# Patient Record
Sex: MALE | Race: ASIAN | NOT HISPANIC OR LATINO
[De-identification: names, ages, dates, MRNs, and addresses within clinical notes are randomized per-mention and may not be internally consistent; named-entity substitution may affect disease eponyms.]

---

## 2017-11-21 ENCOUNTER — TRANSCRIPTION ENCOUNTER (OUTPATIENT)
Age: 37
End: 2017-11-21

## 2019-08-24 ENCOUNTER — TRANSCRIPTION ENCOUNTER (OUTPATIENT)
Age: 39
End: 2019-08-24

## 2020-03-09 ENCOUNTER — TRANSCRIPTION ENCOUNTER (OUTPATIENT)
Age: 40
End: 2020-03-09

## 2020-10-16 ENCOUNTER — APPOINTMENT (OUTPATIENT)
Dept: RADIOLOGY | Facility: CLINIC | Age: 40
End: 2020-10-16

## 2020-10-16 ENCOUNTER — OUTPATIENT (OUTPATIENT)
Dept: OUTPATIENT SERVICES | Facility: HOSPITAL | Age: 40
LOS: 1 days | End: 2020-10-16
Payer: COMMERCIAL

## 2020-10-16 DIAGNOSIS — Z00.8 ENCOUNTER FOR OTHER GENERAL EXAMINATION: ICD-10-CM

## 2020-10-16 PROBLEM — Z00.00 ENCOUNTER FOR PREVENTIVE HEALTH EXAMINATION: Status: ACTIVE | Noted: 2020-10-16

## 2020-10-16 PROCEDURE — 71046 X-RAY EXAM CHEST 2 VIEWS: CPT

## 2020-10-16 PROCEDURE — 71046 X-RAY EXAM CHEST 2 VIEWS: CPT | Mod: 26

## 2021-03-17 ENCOUNTER — TRANSCRIPTION ENCOUNTER (OUTPATIENT)
Age: 41
End: 2021-03-17

## 2021-04-19 ENCOUNTER — APPOINTMENT (OUTPATIENT)
Dept: OTOLARYNGOLOGY | Facility: CLINIC | Age: 41
End: 2021-04-19

## 2021-09-12 ENCOUNTER — TRANSCRIPTION ENCOUNTER (OUTPATIENT)
Age: 41
End: 2021-09-12

## 2022-03-05 ENCOUNTER — TRANSCRIPTION ENCOUNTER (OUTPATIENT)
Age: 42
End: 2022-03-05

## 2023-06-23 ENCOUNTER — NON-APPOINTMENT (OUTPATIENT)
Age: 43
End: 2023-06-23

## 2023-12-30 ENCOUNTER — EMERGENCY (EMERGENCY)
Facility: HOSPITAL | Age: 43
LOS: 1 days | Discharge: HOME | End: 2023-12-30
Attending: EMERGENCY MEDICINE
Payer: COMMERCIAL

## 2023-12-30 VITALS
OXYGEN SATURATION: 99 % | HEART RATE: 96 BPM | HEIGHT: 71 IN | WEIGHT: 175.93 LBS | TEMPERATURE: 98 F | SYSTOLIC BLOOD PRESSURE: 121 MMHG | DIASTOLIC BLOOD PRESSURE: 82 MMHG | RESPIRATION RATE: 20 BRPM

## 2023-12-30 PROCEDURE — 99284 EMERGENCY DEPT VISIT MOD MDM: CPT

## 2023-12-30 PROCEDURE — 99283 EMERGENCY DEPT VISIT LOW MDM: CPT

## 2023-12-30 NOTE — ED PROVIDER NOTE - ATTENDING APP SHARED VISIT CONTRIBUTION OF CARE
Hx: pt with swelling of ears and face over past few days, worse after using shampoo but also after showers in general.  No fever, dyspnea.    PE: well appearing, nontoxic, no respiratory distress.  Neuro nonfocal.  Skin intact. Psych normal mood.  slight swelling of pinna b/l, forehead.  No vesicles.    MDM: contact dermatitis and eczema, steroid cream.  COnsidered and excluded nec fasc, sepsis, anaphyalaxis.

## 2023-12-30 NOTE — ED PROVIDER NOTE - NSFOLLOWUPINSTRUCTIONS_ED_ALL_ED_FT
Hydrate.     Apply Triamcinolone to affected area once a day as instructed.    Take Benadryl (2 tablets of 25mg every 8hours) as needed for itching.    Follow up with dermatology clinic for reevaluation, call Tuesday for appointment.    Return for any concerns, fever, chills, throat discomfort, difficult breathing, or worsening symptoms.

## 2023-12-30 NOTE — ED PROVIDER NOTE - PHYSICAL EXAMINATION
NAD. VSS. Afebrile. +Generalized hives on face with b/l auricle swelling. Normal throat, lips, and tongue. No scalp eryth or cellulitis. Neck supple. Lungs clear. ABD soft, non tender. Neuro- intact.

## 2023-12-30 NOTE — ED PROVIDER NOTE - PATIENT PORTAL LINK FT
You can access the FollowMyHealth Patient Portal offered by [facility] by registering at the following website: [email or web address]. By joining [employer]’s FollowMyHealth portal, you will also be able to view your health information using other applications (apps) compatible with our system. You can access the FollowMyHealth Patient Portal offered by [facility] by registering at the following website: [email or web address]. By joining [employer]’s FollowMyHealth portal, you will also be able to view your health information using other applications (apps) compatible with our system.

## 2023-12-30 NOTE — ED PROVIDER NOTE - OBJECTIVE STATEMENT
42yo male pt, no PMHx, recent hair transplant in [place] (11/2023) presents to ED with facial rash and auricle swelling for 10days. Reports he noticed b/l auricle swelling with itching 10days ago after using scalp shampoo prescribed by the transplant Dr. Denies rash on his trunk and extremities. Denies fever, chills, or recent cold symptoms. Denies throat discomfort. Denies CP/SOB/ABD pain or N/V/D. Denies sensory changes or weakness to extremities. 44yo male pt, no PMHx, recent hair transplant in [place] (11/2023) presents to ED with facial rash and auricle swelling for 10days. Reports he noticed b/l auricle swelling with itching 10days ago after using scalp shampoo prescribed by the transplant Dr. Denies rash on his trunk and extremities. Denies fever, chills, or recent cold symptoms. Denies throat discomfort. Denies CP/SOB/ABD pain or N/V/D. Denies sensory changes or weakness to extremities. 42yo male pt, no PMHx, recent hair transplant in [place] (11/2023) presents to ED with facial rash and auricle swelling for 10days. Reports he noticed b/l auricle swelling with itching 10days ago after using scalp shampoo prescribed by the transplant Dr. Denies rash on his trunk and extremities. Denies fever, chills, or recent cold symptoms. Denies throat discomfort. Denies CP/SOB/ABD pain or N/V/D. Denies sensory changes or weakness to extremities. 42yo male pt, no PMHx, recent hair transplant in [place] (11/2023) presents to ED with facial rash and auricle swelling for 10days. Reports he noticed b/l auricle swelling with itching 10days ago after using scalp shampoo prescribed by the transplant Dr. Denies rash on his trunk and extremities. He also had Amoxicillin for dental infection recently. Denies fever, chills, or recent cold symptoms. Denies throat discomfort. Denies CP/SOB/ABD pain or N/V/D. Denies sensory changes or weakness to extremities.

## 2023-12-30 NOTE — ED PROVIDER NOTE - NSFOLLOWUPCLINICS_GEN_ALL_ED_FT
[facility] - [place]  Dermatology  [address], [address]  [place] [address]  Phone: [phone]  Fax: [phone]     [facility] - [place]  Dermatology  [address], [address]  [place] [address]  Phone: [phone]  Fax: [phone]

## 2023-12-30 NOTE — ED ADULT TRIAGE NOTE - CHIEF COMPLAINT QUOTE
allergic reaction to amoxicillin (was taking amox s/p hair transplant in [place] and for gingivitis), hives on face, no respiratory c/o, +speaking in full sentences

## 2024-12-29 ENCOUNTER — EMERGENCY (EMERGENCY)
Facility: HOSPITAL | Age: 44
LOS: 1 days | Discharge: HOME | End: 2024-12-29
Attending: EMERGENCY MEDICINE
Payer: COMMERCIAL

## 2024-12-29 VITALS
HEIGHT: 70 IN | WEIGHT: 210.1 LBS | TEMPERATURE: 98 F | HEART RATE: 99 BPM | SYSTOLIC BLOOD PRESSURE: 134 MMHG | DIASTOLIC BLOOD PRESSURE: 85 MMHG | RESPIRATION RATE: 20 BRPM | OXYGEN SATURATION: 99 %

## 2024-12-29 VITALS
OXYGEN SATURATION: 96 % | SYSTOLIC BLOOD PRESSURE: 129 MMHG | DIASTOLIC BLOOD PRESSURE: 85 MMHG | HEART RATE: 83 BPM | TEMPERATURE: 98 F | RESPIRATION RATE: 16 BRPM

## 2024-12-29 LAB
ALBUMIN SERPL ELPH-MCNC: 4.5 G/DL — SIGNIFICANT CHANGE UP (ref 3.3–5)
ALP SERPL-CCNC: 92 U/L — SIGNIFICANT CHANGE UP (ref 40–120)
ALT FLD-CCNC: 19 U/L — SIGNIFICANT CHANGE UP (ref 10–45)
ANION GAP SERPL CALC-SCNC: 10 MMOL/L — SIGNIFICANT CHANGE UP (ref 5–17)
APPEARANCE UR: CLEAR — SIGNIFICANT CHANGE UP
AST SERPL-CCNC: 19 U/L — SIGNIFICANT CHANGE UP (ref 10–40)
BACTERIA # UR AUTO: NEGATIVE /HPF — SIGNIFICANT CHANGE UP
BASOPHILS # BLD AUTO: 0.03 K/UL — SIGNIFICANT CHANGE UP (ref 0–0.2)
BASOPHILS NFR BLD AUTO: 0.7 % — SIGNIFICANT CHANGE UP (ref 0–2)
BILIRUB SERPL-MCNC: 0.9 MG/DL — SIGNIFICANT CHANGE UP (ref 0.2–1.2)
BILIRUB UR-MCNC: NEGATIVE — SIGNIFICANT CHANGE UP
BUN SERPL-MCNC: 12 MG/DL — SIGNIFICANT CHANGE UP (ref 7–23)
CALCIUM SERPL-MCNC: 9.4 MG/DL — SIGNIFICANT CHANGE UP (ref 8.4–10.5)
CAST: 0 /LPF — SIGNIFICANT CHANGE UP (ref 0–4)
CHLORIDE SERPL-SCNC: 102 MMOL/L — SIGNIFICANT CHANGE UP (ref 96–108)
CO2 SERPL-SCNC: 27 MMOL/L — SIGNIFICANT CHANGE UP (ref 22–31)
COLOR SPEC: YELLOW — SIGNIFICANT CHANGE UP
CREAT SERPL-MCNC: 1.03 MG/DL — SIGNIFICANT CHANGE UP (ref 0.5–1.3)
DIFF PNL FLD: NEGATIVE — SIGNIFICANT CHANGE UP
EGFR: 91 ML/MIN/1.73M2 — SIGNIFICANT CHANGE UP
EOSINOPHIL # BLD AUTO: 0.03 K/UL — SIGNIFICANT CHANGE UP (ref 0–0.5)
EOSINOPHIL NFR BLD AUTO: 0.7 % — SIGNIFICANT CHANGE UP (ref 0–6)
GAS PNL BLDV: SIGNIFICANT CHANGE UP
GLUCOSE SERPL-MCNC: 116 MG/DL — HIGH (ref 70–99)
GLUCOSE UR QL: NEGATIVE MG/DL — SIGNIFICANT CHANGE UP
HCT VFR BLD CALC: 42.9 % — SIGNIFICANT CHANGE UP (ref 39–50)
HGB BLD-MCNC: 14.5 G/DL — SIGNIFICANT CHANGE UP (ref 13–17)
IMM GRANULOCYTES NFR BLD AUTO: 0.2 % — SIGNIFICANT CHANGE UP (ref 0–0.9)
KETONES UR-MCNC: NEGATIVE MG/DL — SIGNIFICANT CHANGE UP
LEUKOCYTE ESTERASE UR-ACNC: NEGATIVE — SIGNIFICANT CHANGE UP
LIDOCAIN IGE QN: 60 U/L — SIGNIFICANT CHANGE UP (ref 7–60)
LYMPHOCYTES # BLD AUTO: 1.23 K/UL — SIGNIFICANT CHANGE UP (ref 1–3.3)
LYMPHOCYTES # BLD AUTO: 28.2 % — SIGNIFICANT CHANGE UP (ref 13–44)
MCHC RBC-ENTMCNC: 28.2 PG — SIGNIFICANT CHANGE UP (ref 27–34)
MCHC RBC-ENTMCNC: 33.8 G/DL — SIGNIFICANT CHANGE UP (ref 32–36)
MCV RBC AUTO: 83.5 FL — SIGNIFICANT CHANGE UP (ref 80–100)
MONOCYTES # BLD AUTO: 0.5 K/UL — SIGNIFICANT CHANGE UP (ref 0–0.9)
MONOCYTES NFR BLD AUTO: 11.5 % — SIGNIFICANT CHANGE UP (ref 2–14)
NEUTROPHILS # BLD AUTO: 2.56 K/UL — SIGNIFICANT CHANGE UP (ref 1.8–7.4)
NEUTROPHILS NFR BLD AUTO: 58.7 % — SIGNIFICANT CHANGE UP (ref 43–77)
NITRITE UR-MCNC: NEGATIVE — SIGNIFICANT CHANGE UP
NRBC # BLD: 0 /100 WBCS — SIGNIFICANT CHANGE UP (ref 0–0)
PH UR: 7 — SIGNIFICANT CHANGE UP (ref 5–8)
PLATELET # BLD AUTO: 239 K/UL — SIGNIFICANT CHANGE UP (ref 150–400)
POTASSIUM SERPL-MCNC: 4.4 MMOL/L — SIGNIFICANT CHANGE UP (ref 3.5–5.3)
POTASSIUM SERPL-SCNC: 4.4 MMOL/L — SIGNIFICANT CHANGE UP (ref 3.5–5.3)
PROT SERPL-MCNC: 7.5 G/DL — SIGNIFICANT CHANGE UP (ref 6–8.3)
PROT UR-MCNC: SIGNIFICANT CHANGE UP MG/DL
RBC # BLD: 5.14 M/UL — SIGNIFICANT CHANGE UP (ref 4.2–5.8)
RBC # FLD: 12.6 % — SIGNIFICANT CHANGE UP (ref 10.3–14.5)
RBC CASTS # UR COMP ASSIST: 2 /HPF — SIGNIFICANT CHANGE UP (ref 0–4)
REVIEW: SIGNIFICANT CHANGE UP
SODIUM SERPL-SCNC: 139 MMOL/L — SIGNIFICANT CHANGE UP (ref 135–145)
SP GR SPEC: 1.01 — SIGNIFICANT CHANGE UP (ref 1–1.03)
SQUAMOUS # UR AUTO: 0 /HPF — SIGNIFICANT CHANGE UP (ref 0–5)
UROBILINOGEN FLD QL: 1 MG/DL — SIGNIFICANT CHANGE UP (ref 0.2–1)
WBC # BLD: 4.36 K/UL — SIGNIFICANT CHANGE UP (ref 3.8–10.5)
WBC # FLD AUTO: 4.36 K/UL — SIGNIFICANT CHANGE UP (ref 3.8–10.5)
WBC UR QL: 1 /HPF — SIGNIFICANT CHANGE UP (ref 0–5)

## 2024-12-29 PROCEDURE — 82947 ASSAY GLUCOSE BLOOD QUANT: CPT

## 2024-12-29 PROCEDURE — 82803 BLOOD GASES ANY COMBINATION: CPT

## 2024-12-29 PROCEDURE — 83690 ASSAY OF LIPASE: CPT

## 2024-12-29 PROCEDURE — 85018 HEMOGLOBIN: CPT

## 2024-12-29 PROCEDURE — 84132 ASSAY OF SERUM POTASSIUM: CPT

## 2024-12-29 PROCEDURE — 85025 COMPLETE CBC W/AUTO DIFF WBC: CPT

## 2024-12-29 PROCEDURE — 87086 URINE CULTURE/COLONY COUNT: CPT

## 2024-12-29 PROCEDURE — 83605 ASSAY OF LACTIC ACID: CPT

## 2024-12-29 PROCEDURE — 85014 HEMATOCRIT: CPT

## 2024-12-29 PROCEDURE — 99285 EMERGENCY DEPT VISIT HI MDM: CPT

## 2024-12-29 PROCEDURE — 82435 ASSAY OF BLOOD CHLORIDE: CPT

## 2024-12-29 PROCEDURE — 74177 CT ABD & PELVIS W/CONTRAST: CPT | Mod: MC

## 2024-12-29 PROCEDURE — 93975 VASCULAR STUDY: CPT | Mod: 26

## 2024-12-29 PROCEDURE — 81001 URINALYSIS AUTO W/SCOPE: CPT

## 2024-12-29 PROCEDURE — 87491 CHLMYD TRACH DNA AMP PROBE: CPT

## 2024-12-29 PROCEDURE — 84295 ASSAY OF SERUM SODIUM: CPT

## 2024-12-29 PROCEDURE — 87591 N.GONORRHOEAE DNA AMP PROB: CPT

## 2024-12-29 PROCEDURE — 96374 THER/PROPH/DIAG INJ IV PUSH: CPT | Mod: XU

## 2024-12-29 PROCEDURE — 74177 CT ABD & PELVIS W/CONTRAST: CPT | Mod: 26,MC

## 2024-12-29 PROCEDURE — 99284 EMERGENCY DEPT VISIT MOD MDM: CPT

## 2024-12-29 PROCEDURE — 80053 COMPREHEN METABOLIC PANEL: CPT

## 2024-12-29 PROCEDURE — 76870 US EXAM SCROTUM: CPT

## 2024-12-29 PROCEDURE — 93975 VASCULAR STUDY: CPT

## 2024-12-29 PROCEDURE — 96361 HYDRATE IV INFUSION ADD-ON: CPT

## 2024-12-29 PROCEDURE — 76870 US EXAM SCROTUM: CPT | Mod: 26

## 2024-12-29 PROCEDURE — 82330 ASSAY OF CALCIUM: CPT

## 2024-12-29 PROCEDURE — 99284 EMERGENCY DEPT VISIT MOD MDM: CPT | Mod: 25

## 2024-12-29 RX ORDER — ACETAMINOPHEN 500MG 500 MG/1
1000 TABLET, COATED ORAL ONCE
Refills: 0 | Status: COMPLETED | OUTPATIENT
Start: 2024-12-29 | End: 2024-12-29

## 2024-12-29 RX ORDER — SODIUM CHLORIDE 9 MG/ML
1000 INJECTION, SOLUTION INTRAMUSCULAR; INTRAVENOUS; SUBCUTANEOUS ONCE
Refills: 0 | Status: COMPLETED | OUTPATIENT
Start: 2024-12-29 | End: 2024-12-29

## 2024-12-29 RX ADMIN — Medication 1 ENEMA: at 16:54

## 2024-12-29 RX ADMIN — SODIUM CHLORIDE 1000 MILLILITER(S): 9 INJECTION, SOLUTION INTRAMUSCULAR; INTRAVENOUS; SUBCUTANEOUS at 11:32

## 2024-12-29 RX ADMIN — SODIUM CHLORIDE 1000 MILLILITER(S): 9 INJECTION, SOLUTION INTRAMUSCULAR; INTRAVENOUS; SUBCUTANEOUS at 12:30

## 2024-12-29 RX ADMIN — ACETAMINOPHEN 500MG 1000 MILLIGRAM(S): 500 TABLET, COATED ORAL at 12:30

## 2024-12-29 RX ADMIN — ACETAMINOPHEN 500MG 1000 MILLIGRAM(S): 500 TABLET, COATED ORAL at 11:47

## 2024-12-29 RX ADMIN — ACETAMINOPHEN 500MG 400 MILLIGRAM(S): 500 TABLET, COATED ORAL at 11:32

## 2024-12-29 RX ADMIN — Medication 1 ENEMA: at 17:28

## 2024-12-29 NOTE — ED PROVIDER NOTE - OBJECTIVE STATEMENT
44-year-old male with no diagnosed past medical history presenting to ED for 2 days of lower abdominal pain and testicular pain/swelling.  Patient reports he has had several episodes in the past for similar symptoms in which she feels abdominal pain/distention and bloating in his lower abdomen.  Follow-up with GI doctor who did CT outpatient which demonstrated "inflammation".  Reports symptoms resolved spontaneously.  Now having 2 days of gradual, progressive lower abdominal pain that reports radiates to his scrotum.  Now having bilateral testicular pain.  Reports that his testicles are both very tender to touch.  Reports he has had very scant clear discharge 1 time from his penis the last 2 days.  No history of STD/STIs.  Denies fevers or chills.  Reports no difficulties urinating.  Reports normal BM this morning however feels like he is not passing flatus however has no history of intra-abdominal surgeries.  No significant weight loss or gain unintended.

## 2024-12-29 NOTE — ED PROVIDER NOTE - NSFOLLOWUPINSTRUCTIONS_ED_ALL_ED_FT
You are seen in the emergency department for abdominal pain rating to your scrotum.    Your ultrasound of your scrotum/testicles showed no acute findings.    Your CT scan of your abdomen showed no acute findings.    Although the exact source of your abdominal pain cannot be elucidated, emergent causes were effectively ruled out.  He still had some residual pain you are given multiple enemas that produced some moderate bowel movements.    Please follow-up with a gastroenterologist within 1 week.  If you do not have access to your GI doctor you can follow-up with our clinic which is attached.    Tomorrow morning please utilize magnesium citrate over-the-counter, according to packaging instructions daily half container.    You could also use ice over the counter MiraLAX and senna as indicated on the packaging to help regulate your bowel movements.    Follow up with your primary physician in 1-2 days. If needed call [phone] to find a primary care physician or call  [phone] to schedule an appointment with the general medicine.    You may take 500-1000 mg acetaminophen every 6 hours, as needed for pain.  You may take 600 mg ibuprofen every 8 hours, with food, as needed for pain.    1. TAKE ALL MEDICATIONS AS DIRECTED.    2. FOR PAIN OR FEVER YOU CAN TAKE IBUPROFEN (MOTRIN, ADVIL) OR ACETAMINOPHEN (TYLENOL) AS NEEDED, AS DIRECTED ON PACKAGING.  3. FOLLOW UP WITH YOUR PRIMARY DOCTOR WITHIN 5 DAYS AS DIRECTED.  4. IF YOU HAD LABS OR IMAGING DONE, YOU WERE GIVEN COPIES OF ALL LABS AND/OR IMAGING RESULTS FROM YOUR ER VISIT--PLEASE TAKE THEM WITH YOU TO YOUR FOLLOW UP APPOINTMENTS.  5. RETURN TO THE ER FOR ANY WORSENING SYMPTOMS OR CONCERNS.  ----------------------------------------------------------------------------------------------------------------   Abdominal Pain, Adult      Pain in the abdomen (abdominal pain) can be caused by many things. In most cases, it gets better with no treatment or by being treated at home. But in some cases, it can be serious.    Your health care provider will ask questions about your medical history and do a physical exam to try to figure out what is causing your pain.    Follow these instructions at home:    Medicines    Take over-the-counter and prescription medicines only as told by your provider.  Do not take medicines that help you poop (laxatives) unless told by your provider.    General instructions    Watch your condition for any changes.  Drink enough fluid to keep your pee (urine) pale yellow.  Contact a health care provider if:  Your pain changes, gets worse, or lasts longer than expected.  You have severe cramping or bloating in your abdomen, or you vomit.  Your pain gets worse with meals, after eating, or with certain foods.  You are constipated or have diarrhea for more than 2–3 days.  You are not hungry, or you lose weight without trying.  You have signs of dehydration. These may include:  Dark pee, very little pee, or no pee.  Cracked lips or dry mouth.  Sleepiness or weakness.  You have pain when you pee (urinate) or poop.  Your abdominal pain wakes you up at night.  You have blood in your pee.  You have a fever.    Get help right away if:  You cannot stop vomiting.  Your pain is only in one part of the abdomen. Pain on the right side could be caused by appendicitis.  You have bloody or black poop (stool), or poop that looks like tar.  You have trouble breathing.  You have chest pain.    These symptoms may be an emergency. Get help right away. Call 911.  Do not wait to see if the symptoms will go away.  Do not drive yourself to the hospital.

## 2024-12-29 NOTE — ED ADULT TRIAGE NOTE - CHIEF COMPLAINT QUOTE
pt c/o testicular pain and "slight swelling" x 2 days  pt denies trauma/injury, penile swelling/bleeding

## 2024-12-29 NOTE — ED PROVIDER NOTE - PHYSICAL EXAMINATION
GEN: Patient awake and alert. No acute distress, non-toxic. Well appearing.   Head: Normocephalic, atraumatic.  Neck: Nontender, full ROM.   Eyes: PERRLA b/l. EOMI, no scleral icterus, no conjunctival injection. Moist mucous membranes.  CARDIAC: RRR. Normal S1, S2. No murmur, rubs, or gallops. No peripheral edema noted.  PULM: Speaking in full sentences. CTA B/L no wheeze, rales or rhonchi. No signs of respiratory distress, no accessory muscle usage or nasal flaring.  ABD: Soft, moderately TTP in lower quadrants L>R, nondistended.  : No CVA tenderness, moderate suprapubic tenderness. Testicles b/l TTP without significant edema. No palpable scrotal mass. No evident penile discharge from meatus or lesions. +cremasteric reflex (Chaperoned by Dr. Perla).   MSK: Moving all extremities spontaneously. Full ROM in extremities. No obvious deformity.  NEURO: A&Ox3, no focal neurological deficits

## 2024-12-29 NOTE — ED ADULT NURSE REASSESSMENT NOTE - NS ED NURSE REASSESS COMMENT FT1
Report received from MUKESH Torres. On re-assessment pt currently appears comfortable resting in stretcher in hallway with appropriate side rails up for safety. pt is awake and alert, vital signs stable, breathing even and unlabored. Plan of care discussed with pt. Pt pending CT scan

## 2024-12-29 NOTE — ED PROVIDER NOTE - NSFOLLOWUPCLINICS_GEN_ALL_ED_FT
Gastroenterology at [facility]  Gastroenterology  [address] [place], [place] [address]  Phone: [phone]  Fax:   Follow Up Time: 7-10 Days

## 2024-12-29 NOTE — ED PROVIDER NOTE - CLINICAL SUMMARY MEDICAL DECISION MAKING FREE TEXT BOX
44-year-old male with no significant past medical history presenting to ED for 2 days of lower abdominal pain rating to his upper pubic area and scrotum.  Has had similar episodes in the past with exact same symptomatology.  Was diagnosed previously with inflammation of his intestines on CT.  Symptoms have been gradual in progress over the last 2 days with pain primarily originating in the lower abdomen but radiating to the scrotum.  However reports significant discomfort with his bilateral testicles.  Also reported 1 episode of clear penile discharge but no history of STDs STIs fevers or chills.  Is voiding urine appropriately without difficulty.  Had BM this morning that was normal.  No history of intra-abdominal surgeries.  On exam is afebrile.  Hemodynamically stable.  Abdomen is soft with moderate tenderness to palpation of the lower quadrants left greater than right, no peritoneal signs.  Scrotum is nonedematous or erythematous but bilateral testicles are markedly tender to palpation.  Patient however has positive cremasteric reflex.  Given this reassuring finding and symptoms for the last 2 days question for testicular torsion, we will get ultrasound.  DDx also includes but is not limited to intra-abdominal pathology including diverticulitis, appendicitis, pancreatitis, colitis.  No palpable abdominal or scrotal masses.  Consider epididymal orchitis.  Plan for screening labs, VBG, lipase, UA/UCX, urine GC.  CT abdomen pelvis.  Symptomatic management.  Dispo pending labs and imaging.

## 2024-12-29 NOTE — ED PROVIDER NOTE - PROGRESS NOTE DETAILS
Davidson Reyes MD PGY2: Patient reassessed, stable. Ultrasound negative for acute pathology, torsion or evidence of epididymoorchitis.  CT abdomen pelvis pending.  Urine negative.  Labs otherwise nonactionable. Davidson Reyes MD PGY2: Patient reassessed, stable. Patient reassessed, stable.  Abdomen still without significant central tenderness, still endorsing pain that is mostly deeper pelvic.  CT abdomen pelvis completely unremarkable.  Will trial enema x 2. Davidson Reyes MD PGY2: Patient reassessed, stable. Small BM after x2 enema requesting DC. Lab and imaging results were discussed with the patient. Shared decision making was held between provider and patient with regards to disposition decision. All questions and concerns were addressed. Patient is agreeable to disposition plan. Instructed for GI and PCP f/u.

## 2024-12-29 NOTE — ED ADULT NURSE NOTE - OBJECTIVE STATEMENT
1035 44 yr old male brought to ER by wife for further eval and tx of severe testicular pain x 2 days. States he thought it was an intestinal problem initially  and took Gasex. Similar episodes over the last 4 yrs with no significant findings. States slight inflammation was diagnosed. Pressure when voiding. No penile discharge. [facility]. TTP bilat testicles when examined by  No redness or swelling visible, Denies recent injury or heavy lifting

## 2024-12-29 NOTE — ED PROVIDER NOTE - PATIENT PORTAL LINK FT
You can access the FollowMyHealth Patient Portal offered by [facility] by registering at the following website: [email or web address]. By joining [employer]’s FollowMyHealth portal, you will also be able to view your health information using other applications (apps) compatible with our system.

## 2024-12-29 NOTE — ED PROVIDER NOTE - ATTENDING CONTRIBUTION TO CARE
I, David Perla MD, Emergency Medicine Attending Physician, personally saw and examined the patient and I personally made/approve the management plan and take responsibility for the patient management.    MDM: 44-year-old male who is otherwise healthy who presents with lower abdominal pain and bilateral testicular pain and swelling for the last 2 days.  Patient with similar symptoms 4 years ago and had outpatient CT that showed "inflammation" and was treated by a GI doctor.  Patient also states that he has had clear discharge from the penis, but denies any history of STD.    ROS: denies trauma, fevers, chills, chest pain, shortness of breath, flank pain, back pain, nausea, vomiting, diarrhea, constipation, obstipation, hematuria.     On examination, patient with stable vitals, well-appearing, in no acute distress. Cardiac examination RRR, lungs CTAB with no W/R/R.  ABD: Abdomen soft, (+) tenderness to lower abdomen worse on the left side and non-distended, no rebound, no guarding, no rigidity. No CVA tenderness..  Neurovascularly intact in all 4 extremities.     Introduced self to patient explained role as attending physician.  Explained need and purpose of  and obtain patient's consent.  Performed by resident, chaperoned by me.  (+) Tenderness to bilateral testes, right greater than left, but no mass palpable, no penile discharge, and normal cremasteric reflex.    Will obtain ultrasound of testes to evaluate for torsion versus epididymoorchitis.  Will obtain CT Abd/Pelv to assess for acute intraabdominal surgical and infectious pathology.  Will obtain labs to evaluate for hematologic disorder, metabolic derangements, hepatic and renal function, and screen for infection.  Will screen for gonorrhea and chlamydia, but patient declining empiric treatment. I, David Perla MD, Emergency Medicine Attending Physician, personally saw and examined the patient and I personally made/approve the management plan and take responsibility for the patient management.    MDM: 44-year-old male who is otherwise healthy who presents with lower abdominal pain and bilateral testicular pain and swelling for the last 2 days.  Patient with similar symptoms 4 years ago and had outpatient CT that showed "inflammation" and was treated by a GI doctor.  Patient also states that he has had clear discharge from the penis, but denies any history of STD.    ROS: denies trauma, fevers, chills, chest pain, shortness of breath, flank pain, back pain, nausea, vomiting, diarrhea, constipation, obstipation, hematuria.     On examination, patient with stable vitals, well-appearing, in no acute distress. Cardiac examination RRR, lungs CTAB with no W/R/R.  ABD: Abdomen soft, (+) tenderness to lower abdomen worse on the left side and non-distended, no rebound, no guarding, no rigidity. No CVA tenderness..  Neurovascularly intact in all 4 extremities.     Introduced self to patient explained role as attending physician.  Explained need and purpose of  and obtain patient's consent.  Performed by resident, chaperoned by me.  (+) Tenderness to bilateral testes, right greater than left, but no mass palpable, no penile discharge, and normal cremasteric reflex.    Will obtain ultrasound of testes to evaluate for torsion versus epididymoorchitis.  Will obtain CT Abd/Pelv to assess for acute intraabdominal surgical and infectious pathology.  Will obtain labs to evaluate for hematologic disorder, metabolic derangements, hepatic and renal function, and screen for infection.  Will screen for gonorrhea and chlamydia, but patient declining empiric treatment.    Signed out at 3 PM to Dr. Garcia pending imaging and close reassessments for further treatment and disposition decisions.

## 2024-12-29 NOTE — ED PROVIDER NOTE - CARE PLAN
1 Principal Discharge DX:	Undifferentiated abdominal pain   Principal Discharge DX:	Abdominal pain

## 2024-12-29 NOTE — ED ADULT TRIAGE NOTE - GLASGOW COMA SCALE: BEST MOTOR RESPONSE, MLM
(M6) obeys commands Secondary Intention Text (Leave Blank If You Do Not Want): The defect will heal with secondary intention.

## 2024-12-30 LAB
C TRACH RRNA SPEC QL NAA+PROBE: SIGNIFICANT CHANGE UP
CULTURE RESULTS: NO GROWTH — SIGNIFICANT CHANGE UP
N GONORRHOEA RRNA SPEC QL NAA+PROBE: SIGNIFICANT CHANGE UP
SPECIMEN SOURCE: SIGNIFICANT CHANGE UP
SPECIMEN SOURCE: SIGNIFICANT CHANGE UP

## 2025-01-10 PROBLEM — Z78.9 OTHER SPECIFIED HEALTH STATUS: Chronic | Status: ACTIVE | Noted: 2024-12-29

## 2025-02-05 ENCOUNTER — APPOINTMENT (OUTPATIENT)
Dept: UROLOGY | Facility: CLINIC | Age: 45
End: 2025-02-05